# Patient Record
Sex: FEMALE | Race: WHITE | URBAN - METROPOLITAN AREA
[De-identification: names, ages, dates, MRNs, and addresses within clinical notes are randomized per-mention and may not be internally consistent; named-entity substitution may affect disease eponyms.]

---

## 2019-04-09 ENCOUNTER — OFFICE VISIT (OUTPATIENT)
Dept: PODIATRY | Facility: CLINIC | Age: 69
End: 2019-04-09
Payer: COMMERCIAL

## 2019-04-09 ENCOUNTER — ANCILLARY PROCEDURE (OUTPATIENT)
Dept: GENERAL RADIOLOGY | Facility: CLINIC | Age: 69
End: 2019-04-09
Attending: PODIATRIST
Payer: COMMERCIAL

## 2019-04-09 VITALS
SYSTOLIC BLOOD PRESSURE: 135 MMHG | WEIGHT: 190 LBS | BODY MASS INDEX: 28.14 KG/M2 | HEIGHT: 69 IN | DIASTOLIC BLOOD PRESSURE: 80 MMHG | HEART RATE: 78 BPM

## 2019-04-09 DIAGNOSIS — M79.672 LEFT FOOT PAIN: Primary | ICD-10-CM

## 2019-04-09 DIAGNOSIS — B35.1 ONYCHOMYCOSIS OF LEFT GREAT TOE: ICD-10-CM

## 2019-04-09 DIAGNOSIS — L60.1 ONYCHOLYSIS OF TOENAIL: ICD-10-CM

## 2019-04-09 DIAGNOSIS — M79.672 LEFT FOOT PAIN: ICD-10-CM

## 2019-04-09 PROCEDURE — 99203 OFFICE O/P NEW LOW 30 MIN: CPT | Performed by: PODIATRIST

## 2019-04-09 PROCEDURE — 73630 X-RAY EXAM OF FOOT: CPT | Mod: LT

## 2019-04-09 RX ORDER — NAFTIFINE HYDROCHLORIDE 10 MG/G
CREAM TOPICAL DAILY
Qty: 30 G | Refills: 3 | Status: SHIPPED | OUTPATIENT
Start: 2019-04-09

## 2019-04-09 ASSESSMENT — MIFFLIN-ST. JEOR: SCORE: 1461.21

## 2019-04-09 NOTE — LETTER
4/9/2019         RE: Chiquita Laurent  [address] [facility] [address]        Dear Colleague,    Thank you for referring your patient, Chiquita Laurent, to the [facility]. Please see a copy of my visit note below.    PATIENT HISTORY:    Chiquita Laurent is a 68 year old female who presents to clinic for redness and swelling to left great toe. She notes she lost the nail about a year ago. States it will swell up from time to time. Not really painful but wondering why it keeps doing that and if she has a nail infection. Denies acute injury. Had a pedicure a while ago.     Review of Systems:  Patient denies fever, chills, rash, wound, stiffness, limping, numbness, weakness, heart burn, blood in stool, chest pain with activity, calf pain when walking, shortness of breath with activity, chronic cough, easy bleeding/bruising, swelling of ankles, excessive thirst, fatigue, depression, anxiety.       PAST MEDICAL HISTORY:   Past Medical History:   Diagnosis Date     Hypercholesterolemia      Hypertension      Hypothyroidism         PAST SURGICAL HISTORY:   Past Surgical History:   Procedure Laterality Date     Excision of 19 lipomas  6/2007     partial thyroidectomy          MEDICATIONS:   Current Outpatient Medications:      ATORVASTATIN CALCIUM PO, Take 10 mg by mouth daily , Disp: , Rfl:      Levothyroxine Sodium (SYNTHROID PO), Take 75 mcg by mouth daily , Disp: , Rfl:      meclizine (ANTIVERT) 12.5 MG tablet, Take 1-2 tablets (12.5-25 mg) by mouth 4 times daily as needed for dizziness, Disp: 30 tablet, Rfl: 0     valsartan-hydrochlorothiazide (DIOVAN-HCT) 160-25 MG per tablet, Take 1 tablet by mouth daily, Disp: , Rfl:      ALLERGIES:  No Known Allergies     SOCIAL HISTORY:   Social History     Socioeconomic History     Marital status:      Spouse name: Not on file     Number of children: Not on file     Years of education: Not on file     Highest education level: Not on file  "  Occupational History     Not on file   Social Needs     Financial resource strain: Not on file     Food insecurity:     Worry: Not on file     Inability: Not on file     Transportation needs:     Medical: Not on file     Non-medical: Not on file   Tobacco Use     Smoking status: Not on file   Substance and Sexual Activity     Alcohol use: Not on file     Drug use: Not on file     Sexual activity: Not on file   Lifestyle     Physical activity:     Days per week: Not on file     Minutes per session: Not on file     Stress: Not on file   Relationships     Social connections:     Talks on phone: Not on file     Gets together: Not on file     Attends Nondenominational service: Not on file     Active member of club or organization: Not on file     Attends meetings of clubs or organizations: Not on file     Relationship status: Not on file     Intimate partner violence:     Fear of current or ex partner: Not on file     Emotionally abused: Not on file     Physically abused: Not on file     Forced sexual activity: Not on file   Other Topics Concern     Not on file   Social History Narrative     Not on file        FAMILY HISTORY: No family history on file.     EXAM:Vitals: /80   Pulse 78   Ht 1.753 m (5' 9\")   Wt 86.2 kg (190 lb)   BMI 28.06 kg/m       General appearance: Patient is alert and fully cooperative with history & exam.  No sign of distress is noted during the visit.     Psychiatric: Affect is pleasant & appropriate.  Patient appears motivated to improve health.     Respiratory: Breathing is regular & unlabored while sitting.     HEENT: Hearing is intact to spoken word.  Speech is clear.  No gross evidence of visual impairment that would impact ambulation.     Dermatologic: left great toenail is partially onycholysed at the proximal margin. No redness, open lesion or purulent drainage noted. Nail is yellowed in color.      Vascular: DP & PT pulses are intact & regular bilaterally.  No significant edema or " varicosities noted.  CFT and skin temperature is normal to both lower extremities.     Neurologic: Lower extremity sensation is intact to light touch.  No evidence of weakness or contracture in the lower extremities.  No evidence of neuropathy.     Musculoskeletal: Patient is ambulatory without assistive device or brace.  No gross ankle deformity noted.  No foot or ankle joint effusion is noted.     ASSESSMENT:    Left foot pain  Onychomycosis of left great toe  Onycholysis of toenail     PLAN:  Reviewed patient's chart in epic. Discussed causes and treatments of nail fungus.  Explained that even if a culture comes back negative, a patient could still have nail fungus.  Discussed treatment options with patient and explained that there isn't one treatment that is 100% effective.  Discussed oral lamisil which is the most effective at about 70% but which can have liver effects.  Explained that if she wanted to try this that she would need serial blood draws to test her liver function.  Discussed over the counter antifungal creams.  Explained that these are about 50% effective and need to be applied once a day for about 6-8months.  Also talked about prescription penlac which is a nail laquer.  Again this is also only 50% effective.  Also discussed that if there was damage to the nail and the nail is now dystrophic that non of the above is going to change the nail.  If there was damage, there is note anything that can be done for the nail to correct it.  Discussed that if it becomes painful, we can remove the nail in clinic.        At this time, she is going to try and antifungal cream. Discussed if it becomes sore, we can remove the nail. She wants to avoid that if possible.          Jimena North DPM, Podiatry/Foot and Ankle Surgery    Weight management plan: Patient was referred to their PCP to discuss a diet and exercise plan.    Recommended to Chiquita Laurent to follow up with Primary Care provider regarding  elevated blood pressure.        Again, thank you for allowing me to participate in the care of your patient.        Sincerely,        Jimena North DPM, Podiatry/Foot and Ankle Surgery

## 2019-04-09 NOTE — PATIENT INSTRUCTIONS
Thank you for choosing [facility]!    DR. VALENCIA'S CLINIC SCHEDULE  MONDAY AM - [place] TUESDAY - [place]   [address] [address] [address] [address] [place] [address]   [phone] / [phone] / [phone]       WEDNESDAY - [place] FRIDAY AM - [facility]   [address]   [address] [address] [place] [address]   [phone] / [phone]       FRIDAY PM - [place] SCHEDULE SURGERY: [phone]   [address] BILLING QUESTIONS: [phone]   [address] [address] AFTER HOURS: [phone] / [phone] APPOINTMENTS: [phone]     Consumer Price Line (CPL) [phone]       NAIL FUNGUS / ONYCHOMYCOSIS   Nail fungus is not a hygiene problem and will not likely lead to significant medical   problems. The nails may get thick causing pain and possibly local skin infection.   Treatments include debridement (trimming), oral antifungals, topical antifungals and complete removal of the nail. Most fungal nails are not treated.   Topicals such as tea tree oil can be helpful for surface fungus and may, at best, limit   progression. Over the counter creams (such as Lamisil) can also be used however, their effectiveness is also quite low.  Topical treatment with Pen lac is expensive and often not covered by insurance. Pen lac has an approximate 8% success rate. Topical therapy recommendations is to apply twice a day for at least 3-4 months as it takes 9 months for new nail to grow out.    Experts suggest soaking your feet for 15 to 20 minutes in a mixture of 1 cup vinegar to 4 cups warm water. Be sure to rinse well and pat your feet dry when you're done. You can soak your feet like this daily. But if your skin becomes irritated, try soaking only two to three times a week. Vicks VapoRub, as with vinegar, there have been no controlled clinical trials to assess the  effectiveness of Vicks VapoRub on nail fungus, but there have been numerous anecdotal reports that it works. There's no consensus on how often to apply this product, so check with your doctor before using it on your nails.     Oral therapies include Sporanox and Lamisil. Oral therapies are also expensive and not very effective. Side effects such as liver disease are the main concern. Return of fungus is common even if the treatment worked.     Other Tips:  - Penlac nail medication apply daily x 4 months; remove old polish first day of each week  - Antifungal cream/powder (Zeasorb) - apply daily to feet and shoes x 2 months  - Clean shoes with Lysol or in washing machine every few weeks  - Rotate shoe gear; give them 24 hours to dry out between days wearing them  - Clean pair of socks in morning, clean pair in afternoon if your feet sweat  - Shower shoes used in public showers/pools    INGROWN TOENAILS  When a toenail is ingrown, it is curved and grows into the skin, usually at the nail borders (the sides of the nail). This  digging in  of the nail irritates the skin, often creating pain, redness, swelling, and warmth in the toe.  If an ingrown nail causes a break in the skin, bacteria may enter and cause an infection in the area, which is often marked by drainage and a foul odor. However, even if the toe isn t painful, red, swollen, or warm, a nail that curves downward into the skin can progress to an infection.  CAUSES:  Heredity: In many people, the tendency for ingrown toenails is inherited.   Trauma: Sometimes an ingrown toenail is the result of trauma, such as stubbing your toe, having an object fall on your toe, or engaging in activities that involve repeated pressure on the toes, such as kicking or running.   Improper Trimming:  The most common cause of ingrown toenails is cutting your nails too short. This encourages the skin next to the nail to fold over the nail.   Improperly Sized Footwear: Ingrown  toenails can result from wearing socks and shoes that are tight or short.   Nail Conditions: Ingrown toenails can be caused by nail problems, such as fungal infections or losing a nail due to trauma.   TREATMENT: Sometimes initial treatment for ingrown toenails can be safely performed at home. However, home treatment is strongly discouraged if an infection is suspected, or for those who have medical conditions that put feet at high risk, such as diabetes, nerve damage in the foot, or poor circulation.  Home care: If you don t have an infection or any of the above medical conditions, you can soak your foot in room-temperature water (adding Epsom s salt may be recommended by your doctor), and gently massage the side of the nail fold to help reduce the inflammation.  Avoid attempting  bathroom surgery.  Repeated cutting of the nail can cause the condition to worsen over time. If your symptoms fail to improve, it s time to see a foot and ankle surgeon.  Physician care: After examining the toe, the foot and ankle surgeon will select the treatment best suited for you. If an infection is present, an oral antibiotic may be prescribed.  Sometimes a minor surgical procedure, often performed in the office, will ease the pain and remove the offending nail. After applying a local anesthetic, the doctor removes part of the nail s side border. Some nails may become ingrown again, requiring removal of the nail root.  Following the nail procedure, a light bandage will be applied. Most people experience very little pain after surgery and may resume normal activity the next day. If your surgeon has prescribed an oral antibiotic, be sure to take all the medication, even if your symptoms have improved.  PREVENTION:  Proper Trimming: Cut toenails in a fairly straight line, and don t cut them too short. You should be able to get your fingernail under the sides and end of the nail.   Well-fitting Footwear: Don t wear shoes that are short  or tight in the toe area. Avoid shoes that are loose, because they too cause pressure on the toes, especially when running or walking briskly.     INGROWN TOENAIL REMOVAL AFTERCARE     Go directly home and elevate the affected foot on one or two pillows for the remainder of the day/evening if possible. Your toe may stay numb anywhere from 2-8 hours.     Take Tylenol, ibuprofen or another anti-inflammatory as needed for pain.     Take antibiotic if that has been prescribed. Finish the entire prescribed antibiotic even if your symptoms have improved.     The evening of the procedure, soak/wash the affected area in warm water (you may add Epsom salt) for 5 to 10 minutes. Do this twice a day for 2-4 weeks (6-8 weeks if you had phenol) (you may count showering/bathing as one soak).  After soaks, pat the area dry and then allow to airdry for a few minutes. Apply antibiotic ointment to the area and cover with 2 X 2 gauze and paper tape or band-aid.    You may pursue everyday activities as tolerated with either an open toe shoe or cut-out shoe as needed or you may wear regular shoes if no pain is noted.    Watch for any signs and symptoms of infection such as: redness, red streaks going up the foot/leg, swelling, pus or foul odor. Those that have had the phenol procedure, the toe will drain longer and will look like it is infected because it is a chemical burn.     Please call with questions.      BODY WEIGHT AND YOUR FEET  The following information is included in the after visit summary for all patients. Body weight can be a sensitive issue to discuss in clinic, but we think the following information is very important. Although we focus on the feet and ankles, we do support the overall health of our patients.     Many things can cause foot and ankle problems. Foot structure, activity level, foot mechanics and injuries are common causes of pain. One very important issue that often goes unmentioned, is body weight. Extra  weight can cause increased stress on muscles, ligaments, bones and tendons. Sometimes just a few extra pounds is all it takes to put one over her/his threshold. Without reducing that stress, it can be difficult to alleviate pain. As Foot & Ankle specialists, our job is addressing the lower extremity problem and possible causes. Regarding extra body weight, we encourage patients to discuss diet and weight management plans with their primary care doctors. It is this team approach that gives you the best opportunity for pain relief and getting you back on your feet.      [facility] has a Comprehensive Weight Management Program. This program includes counseling, education, non-surgical and surgical approaches to weight loss. If you are interested in learning more either talk to you primary care provider or call [phone].

## 2019-04-09 NOTE — PROGRESS NOTES
PATIENT HISTORY:    Chiquita Laurent is a 68 year old female who presents to clinic for redness and swelling to left great toe. She notes she lost the nail about a year ago. States it will swell up from time to time. Not really painful but wondering why it keeps doing that and if she has a nail infection. Denies acute injury. Had a pedicure a while ago.     Review of Systems:  Patient denies fever, chills, rash, wound, stiffness, limping, numbness, weakness, heart burn, blood in stool, chest pain with activity, calf pain when walking, shortness of breath with activity, chronic cough, easy bleeding/bruising, swelling of ankles, excessive thirst, fatigue, depression, anxiety.       PAST MEDICAL HISTORY:   Past Medical History:   Diagnosis Date     Hypercholesterolemia      Hypertension      Hypothyroidism         PAST SURGICAL HISTORY:   Past Surgical History:   Procedure Laterality Date     Excision of 19 lipomas  6/2007     partial thyroidectomy          MEDICATIONS:   Current Outpatient Medications:      ATORVASTATIN CALCIUM PO, Take 10 mg by mouth daily , Disp: , Rfl:      Levothyroxine Sodium (SYNTHROID PO), Take 75 mcg by mouth daily , Disp: , Rfl:      meclizine (ANTIVERT) 12.5 MG tablet, Take 1-2 tablets (12.5-25 mg) by mouth 4 times daily as needed for dizziness, Disp: 30 tablet, Rfl: 0     valsartan-hydrochlorothiazide (DIOVAN-HCT) 160-25 MG per tablet, Take 1 tablet by mouth daily, Disp: , Rfl:      ALLERGIES:  No Known Allergies     SOCIAL HISTORY:   Social History     Socioeconomic History     Marital status:      Spouse name: Not on file     Number of children: Not on file     Years of education: Not on file     Highest education level: Not on file   Occupational History     Not on file   Social Needs     Financial resource strain: Not on file     Food insecurity:     Worry: Not on file     Inability: Not on file     Transportation needs:     Medical: Not on file     Non-medical: Not on file  "  Tobacco Use     Smoking status: Not on file   Substance and Sexual Activity     Alcohol use: Not on file     Drug use: Not on file     Sexual activity: Not on file   Lifestyle     Physical activity:     Days per week: Not on file     Minutes per session: Not on file     Stress: Not on file   Relationships     Social connections:     Talks on phone: Not on file     Gets together: Not on file     Attends Spiritism service: Not on file     Active member of club or organization: Not on file     Attends meetings of clubs or organizations: Not on file     Relationship status: Not on file     Intimate partner violence:     Fear of current or ex partner: Not on file     Emotionally abused: Not on file     Physically abused: Not on file     Forced sexual activity: Not on file   Other Topics Concern     Not on file   Social History Narrative     Not on file        FAMILY HISTORY: No family history on file.     EXAM:Vitals: /80   Pulse 78   Ht 1.753 m (5' 9\")   Wt 86.2 kg (190 lb)   BMI 28.06 kg/m      General appearance: Patient is alert and fully cooperative with history & exam.  No sign of distress is noted during the visit.     Psychiatric: Affect is pleasant & appropriate.  Patient appears motivated to improve health.     Respiratory: Breathing is regular & unlabored while sitting.     HEENT: Hearing is intact to spoken word.  Speech is clear.  No gross evidence of visual impairment that would impact ambulation.     Dermatologic: left great toenail is partially onycholysed at the proximal margin. No redness, open lesion or purulent drainage noted. Nail is yellowed in color.      Vascular: DP & PT pulses are intact & regular bilaterally.  No significant edema or varicosities noted.  CFT and skin temperature is normal to both lower extremities.     Neurologic: Lower extremity sensation is intact to light touch.  No evidence of weakness or contracture in the lower extremities.  No evidence of neuropathy.   "   Musculoskeletal: Patient is ambulatory without assistive device or brace.  No gross ankle deformity noted.  No foot or ankle joint effusion is noted.     ASSESSMENT:    Left foot pain  Onychomycosis of left great toe  Onycholysis of toenail     PLAN:  Reviewed patient's chart in epic. Discussed causes and treatments of nail fungus.  Explained that even if a culture comes back negative, a patient could still have nail fungus.  Discussed treatment options with patient and explained that there isn't one treatment that is 100% effective.  Discussed oral lamisil which is the most effective at about 70% but which can have liver effects.  Explained that if she wanted to try this that she would need serial blood draws to test her liver function.  Discussed over the counter antifungal creams.  Explained that these are about 50% effective and need to be applied once a day for about 6-8months.  Also talked about prescription penlac which is a nail laquer.  Again this is also only 50% effective.  Also discussed that if there was damage to the nail and the nail is now dystrophic that non of the above is going to change the nail.  If there was damage, there is note anything that can be done for the nail to correct it.  Discussed that if it becomes painful, we can remove the nail in clinic.        At this time, she is going to try and antifungal cream. Discussed if it becomes sore, we can remove the nail. She wants to avoid that if possible.          Jimena North DPM, Podiatry/Foot and Ankle Surgery    Weight management plan: Patient was referred to their PCP to discuss a diet and exercise plan.    Recommended to Chiquita Laurent to follow up with Primary Care provider regarding elevated blood pressure.

## 2019-04-16 ENCOUNTER — TELEPHONE (OUTPATIENT)
Dept: PODIATRY | Facility: CLINIC | Age: 69
End: 2019-04-16

## 2019-04-16 DIAGNOSIS — B35.1 ONYCHOMYCOSIS OF TOENAIL: Primary | ICD-10-CM

## 2019-04-16 NOTE — TELEPHONE ENCOUNTER
Pt seen, assessed and dc per provider without RN.    naftifine (NAFTIN) 1 % external cream 30 g 3 4/9/2019  --   Sig - Route: Apply topically daily Apply to affected nail daily - Topical   Sent to pharmacy as: naftifine (NAFTIN) 1 % external cream   Class: E-Prescribe   Order: [id number]   E-Prescribing Status: Receipt confirmed by pharmacy (4/9/2019  4:06 PM CDT       Coverage information:     Subscriber: [id number] SHAZIA SALTER     Rel to sub: 01 - Self     Member ID: [id number]     Payor: [address] [place] [address]     Benefit plan: [id number]VANTAGE Ph: [phone]     Group number: [id number]     Member effective dates: from 01/01/19        Moustapha Gar on 4/16/2019 at 8:21 AM

## 2019-04-19 NOTE — TELEPHONE ENCOUNTER
PA Initiation    Medication: NAFTIFINE- INITIATED  Insurance Company: [employer] - Phone [phone] Fax [phone]  [facility] the [facility] [address] [place], [place] [address], [place] [id number] AT [facility] OF [address]  Filling Pharmacy Phone: [phone]  Filling Pharmacy Fax:    Start Date: 4/19/2019

## 2019-04-22 RX ORDER — KETOCONAZOLE 20 MG/G
CREAM TOPICAL DAILY
Qty: 30 G | Refills: 2 | Status: SHIPPED | OUTPATIENT
Start: 2019-04-22

## 2019-04-22 NOTE — TELEPHONE ENCOUNTER
LVM for patient to call back to clinic - new anti-fungal cream had been ordered and sent to pharmacy as Naftine had been denied.        Moustapha Gar on 4/22/2019 at 11:21 AM

## 2019-04-22 NOTE — TELEPHONE ENCOUNTER
PRIOR AUTHORIZATION DENIED    Medication: NAFTIFINE- DENIED    Denial Date: 4/19/2019    Denial Rational: Patient has to try and fail 2 of the following topical products: clomitrazole cream, ciclopirox 0.77% cream/gel/suspension, or ketoconazole cream.    Appeal Information: If provider would like to appeal we will need a detailed letter of medical necessity to start the process. Then re-route this request back to the PA pool.

## 2019-04-22 NOTE — TELEPHONE ENCOUNTER
Put in order for different fungal cream for nails.    Please let patient know.     Jimena North DPM

## 2019-04-23 NOTE — TELEPHONE ENCOUNTER
Reason for Call:  Other call back    Detailed comments: pt returned phone call. I explained a new script was sent out for her but she said she no longer uses "[facility]. She uses [facility] mail order. She did say she may pick it up from "[facility] depending on the cost. Please call pt back if any questions. Thank you.    Phone Number Patient can be reached at: Home number on file [phone] (home)    Best Time: any    Can we leave a detailed message on this number? YES    Call taken on 4/23/2019 at 9:06 AM by Janel Benitez

## 2019-04-23 NOTE — TELEPHONE ENCOUNTER
Pt called back.  She states this is only $6 at [facility] so will pick that up there.  Steph Aguillon